# Patient Record
Sex: FEMALE | URBAN - METROPOLITAN AREA
[De-identification: names, ages, dates, MRNs, and addresses within clinical notes are randomized per-mention and may not be internally consistent; named-entity substitution may affect disease eponyms.]

---

## 2018-09-24 ENCOUNTER — OFFICE VISIT (OUTPATIENT)
Dept: URBAN - METROPOLITAN AREA CLINIC 33 | Facility: CLINIC | Age: 68
End: 2018-09-24
Payer: MEDICARE

## 2018-09-24 DIAGNOSIS — H40.1111 PRIMARY OPEN-ANGLE GLAUCOMA, RIGHT EYE, MILD STAGE: Primary | ICD-10-CM

## 2018-09-24 PROCEDURE — 99213 OFFICE O/P EST LOW 20 MIN: CPT | Performed by: OPTOMETRIST

## 2018-09-24 RX ORDER — LATANOPROST 50 UG/ML
0.005 % SOLUTION OPHTHALMIC
Qty: 3 | Refills: 3 | Status: INACTIVE
Start: 2018-09-24 | End: 2019-12-23

## 2018-09-24 ASSESSMENT — INTRAOCULAR PRESSURE
OS: 22
OD: 14
OS: 20
OD: 19

## 2018-09-24 NOTE — IMPRESSION/PLAN
Impression: Primary open-angle glaucoma, right eye, mild stage: H40.1111. Plan: IOP 14/22 w/ Lumigan QHS OU. No visual field defects OD noted, mild RNFL thinning at previous ONH-OCT OU. Continue Lumigan QHS OU.  RTC 6 months for CFM and ONH-OCT OU

## 2019-03-25 ENCOUNTER — OFFICE VISIT (OUTPATIENT)
Dept: URBAN - METROPOLITAN AREA CLINIC 33 | Facility: CLINIC | Age: 69
End: 2019-03-25
Payer: MEDICARE

## 2019-03-25 DIAGNOSIS — H40.1423 CAPSULAR GLAUCOMA W/ PSEUDOEXFOLIATION OF LENS OF LEFT EYE, SEVERE STAGE: Primary | ICD-10-CM

## 2019-03-25 PROCEDURE — 92133 CPTRZD OPH DX IMG PST SGM ON: CPT | Performed by: OPTOMETRIST

## 2019-03-25 PROCEDURE — 99213 OFFICE O/P EST LOW 20 MIN: CPT | Performed by: OPTOMETRIST

## 2019-03-25 ASSESSMENT — INTRAOCULAR PRESSURE
OD: 14
OS: 22

## 2019-03-25 NOTE — IMPRESSION/PLAN
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 Plan: IOPS 14/22 with Lumigan QHS OU. Intraocular pressure WNL. Continue same treatment. Continue Lumigan QHS OU .

## 2019-09-25 ENCOUNTER — OFFICE VISIT (OUTPATIENT)
Dept: URBAN - METROPOLITAN AREA CLINIC 33 | Facility: CLINIC | Age: 69
End: 2019-09-25
Payer: MEDICARE

## 2019-09-25 PROCEDURE — 99213 OFFICE O/P EST LOW 20 MIN: CPT | Performed by: OPTOMETRIST

## 2019-09-25 ASSESSMENT — INTRAOCULAR PRESSURE
OS: 20
OS: 24
OD: 17
OD: 20

## 2019-09-25 NOTE — IMPRESSION/PLAN
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 Plan: IOPS 17/20 with Latanoprost  QHS OU. Intraocular pressure WNL. Continue same treatment. 6 month for ONH-OCT and IOP check.

## 2020-06-16 ENCOUNTER — OFFICE VISIT (OUTPATIENT)
Dept: URBAN - METROPOLITAN AREA CLINIC 33 | Facility: CLINIC | Age: 70
End: 2020-06-16
Payer: MEDICARE

## 2020-06-16 PROCEDURE — 99213 OFFICE O/P EST LOW 20 MIN: CPT | Performed by: OPTOMETRIST

## 2020-06-16 PROCEDURE — 92133 CPTRZD OPH DX IMG PST SGM ON: CPT | Performed by: OPTOMETRIST

## 2020-06-16 ASSESSMENT — INTRAOCULAR PRESSURE
OS: 17
OD: 18

## 2020-06-16 NOTE — IMPRESSION/PLAN
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 Plan: IOPS 18/17 with avg. pachs and Latanoprost QHS OU. Intraocular pressure WNL for optic nerve appearance. Continue same treatment. 6 month dilation and IOP check.

## 2020-12-15 ENCOUNTER — OFFICE VISIT (OUTPATIENT)
Dept: URBAN - METROPOLITAN AREA CLINIC 33 | Facility: CLINIC | Age: 70
End: 2020-12-15
Payer: MEDICARE

## 2020-12-15 PROCEDURE — 99213 OFFICE O/P EST LOW 20 MIN: CPT | Performed by: OPTOMETRIST

## 2020-12-15 ASSESSMENT — INTRAOCULAR PRESSURE
OS: 21
OD: 18

## 2020-12-15 NOTE — IMPRESSION/PLAN
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 Plan: IOPS 18/121 with Latanoprost QHS OU and average pachs. Pressures WNL. Continue the same medication regimen.

## 2021-06-15 ENCOUNTER — OFFICE VISIT (OUTPATIENT)
Dept: URBAN - METROPOLITAN AREA CLINIC 33 | Facility: CLINIC | Age: 71
End: 2021-06-15
Payer: COMMERCIAL

## 2021-06-15 PROCEDURE — 99213 OFFICE O/P EST LOW 20 MIN: CPT | Performed by: OPTOMETRIST

## 2021-06-15 ASSESSMENT — INTRAOCULAR PRESSURE
OS: 20
OD: 18

## 2021-06-15 NOTE — IMPRESSION/PLAN
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 Plan: IOPS 18/20 with Latanoprost QHS OU and average pachs. Intraocular pressures normal. Continue using Latanoprost QHS OU.

## 2021-12-16 ENCOUNTER — OFFICE VISIT (OUTPATIENT)
Dept: URBAN - METROPOLITAN AREA CLINIC 33 | Facility: CLINIC | Age: 71
End: 2021-12-16
Payer: MEDICARE

## 2021-12-16 PROCEDURE — 92083 EXTENDED VISUAL FIELD XM: CPT | Performed by: OPTOMETRIST

## 2021-12-16 PROCEDURE — 99213 OFFICE O/P EST LOW 20 MIN: CPT | Performed by: OPTOMETRIST

## 2021-12-16 ASSESSMENT — INTRAOCULAR PRESSURE
OS: 20
OD: 19

## 2021-12-16 NOTE — IMPRESSION/PLAN
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 Plan: IOPS 19/20 with Latanoprost QHS OU and average pachs. Pressures WNL. Visual field shows OD: edge defect, poor reliability. Continue the same medication regimen.

## 2022-06-17 ENCOUNTER — OFFICE VISIT (OUTPATIENT)
Dept: URBAN - METROPOLITAN AREA CLINIC 33 | Facility: CLINIC | Age: 72
End: 2022-06-17
Payer: MEDICARE

## 2022-06-17 DIAGNOSIS — H40.1423 CAPSULAR GLAUCOMA W/ PSEUDOEXFOLIATION OF LENS OF LEFT EYE, SEVERE STAGE: Primary | ICD-10-CM

## 2022-06-17 PROCEDURE — 99213 OFFICE O/P EST LOW 20 MIN: CPT | Performed by: OPTOMETRIST

## 2022-06-17 ASSESSMENT — INTRAOCULAR PRESSURE
OD: 14
OS: 15

## 2022-12-20 ENCOUNTER — OFFICE VISIT (OUTPATIENT)
Dept: URBAN - METROPOLITAN AREA CLINIC 33 | Facility: CLINIC | Age: 72
End: 2022-12-20
Payer: MEDICARE

## 2022-12-20 DIAGNOSIS — H40.1111 PRIMARY OPEN-ANGLE GLAUCOMA, RIGHT EYE, MILD STAGE: Primary | ICD-10-CM

## 2022-12-20 DIAGNOSIS — H40.1423 CAPSULAR GLAUCOMA W/ PSEUDOEXFOLIATION OF LENS OF LEFT EYE, SEVERE STAGE: ICD-10-CM

## 2022-12-20 PROCEDURE — 99213 OFFICE O/P EST LOW 20 MIN: CPT | Performed by: OPTOMETRIST

## 2022-12-20 PROCEDURE — 92083 EXTENDED VISUAL FIELD XM: CPT | Performed by: OPTOMETRIST

## 2022-12-20 ASSESSMENT — INTRAOCULAR PRESSURE
OS: 22
OD: 18

## 2022-12-20 NOTE — IMPRESSION/PLAN
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  Plan: See plan #1

## 2022-12-20 NOTE — IMPRESSION/PLAN
Impression: Primary open-angle glaucoma, right eye, mild stage: H40.1111. Plan: IOPS 18/22 with Latanoprost QHS OU and average pachs. Condition stable for optic nerve appearance. Return for visual field and follow up. -Reviewed visual field with patient today.

## 2023-06-21 ENCOUNTER — OFFICE VISIT (OUTPATIENT)
Dept: URBAN - METROPOLITAN AREA CLINIC 33 | Facility: CLINIC | Age: 73
End: 2023-06-21
Payer: COMMERCIAL

## 2023-06-21 DIAGNOSIS — H40.1111 PRIMARY OPEN-ANGLE GLAUCOMA, RIGHT EYE, MILD STAGE: Primary | ICD-10-CM

## 2023-06-21 PROCEDURE — 99213 OFFICE O/P EST LOW 20 MIN: CPT | Performed by: OPTOMETRIST

## 2023-06-21 PROCEDURE — 92133 CPTRZD OPH DX IMG PST SGM ON: CPT | Performed by: OPTOMETRIST

## 2023-06-21 ASSESSMENT — INTRAOCULAR PRESSURE
OD: 19
OS: 20

## 2023-06-21 NOTE — IMPRESSION/PLAN
Impression: Primary open-angle glaucoma, right eye, mild stage: H40.1111. Plan: IOPS 19/20 with Latanoprost QHS OU and average pachs. Intraocular pressure continues to be WNL. Ordered and reviewed ONH-OCT showing, OD: mild inferior thinning OS: severe thinning. Continue Latanoprost at this time.

## 2023-12-22 ENCOUNTER — OFFICE VISIT (OUTPATIENT)
Dept: URBAN - METROPOLITAN AREA CLINIC 33 | Facility: CLINIC | Age: 73
End: 2023-12-22
Payer: COMMERCIAL

## 2023-12-22 DIAGNOSIS — H40.1423 CAPSULAR GLAUCOMA W/ PSEUDOEXFOLIATION OF LENS OF LEFT EYE, SEVERE STAGE: Primary | ICD-10-CM

## 2023-12-22 DIAGNOSIS — H40.1111 PRIMARY OPEN-ANGLE GLAUCOMA, RIGHT EYE, MILD STAGE: ICD-10-CM

## 2023-12-22 PROCEDURE — 92083 EXTENDED VISUAL FIELD XM: CPT

## 2023-12-22 PROCEDURE — 99213 OFFICE O/P EST LOW 20 MIN: CPT

## 2023-12-22 ASSESSMENT — INTRAOCULAR PRESSURE
OS: 24
OS: 26
OD: 20

## 2024-06-26 ENCOUNTER — OFFICE VISIT (OUTPATIENT)
Dept: URBAN - METROPOLITAN AREA CLINIC 33 | Facility: CLINIC | Age: 74
End: 2024-06-26
Payer: COMMERCIAL

## 2024-06-26 DIAGNOSIS — H40.1423 CAPSULAR GLAUCOMA WITH PSEUDOEXFOLIATION OF LENS, LEFT EYE, SEVERE STAGE: Primary | ICD-10-CM

## 2024-06-26 DIAGNOSIS — H40.1111 PRIMARY OPEN-ANGLE GLAUCOMA, RIGHT EYE, MILD STAGE: ICD-10-CM

## 2024-06-26 PROCEDURE — 99213 OFFICE O/P EST LOW 20 MIN: CPT

## 2024-06-26 PROCEDURE — 92133 CPTRZD OPH DX IMG PST SGM ON: CPT

## 2024-06-26 RX ORDER — TIMOLOL MALEATE 5 MG/ML
0.5 % SOLUTION/ DROPS OPHTHALMIC
Qty: 3 | Refills: 11 | Status: ACTIVE
Start: 2024-06-26

## 2024-06-26 ASSESSMENT — INTRAOCULAR PRESSURE
OD: 20
OD: 23
OS: 21
OS: 26

## 2024-06-26 ASSESSMENT — VISUAL ACUITY: OD: 20/20

## 2024-12-20 ENCOUNTER — OFFICE VISIT (OUTPATIENT)
Dept: URBAN - METROPOLITAN AREA CLINIC 33 | Facility: CLINIC | Age: 74
End: 2024-12-20
Payer: COMMERCIAL

## 2024-12-20 DIAGNOSIS — H40.1111 PRIMARY OPEN-ANGLE GLAUCOMA, RIGHT EYE, MILD STAGE: ICD-10-CM

## 2024-12-20 DIAGNOSIS — H40.1423 CAPSULAR GLAUCOMA WITH PSEUDOEXFOLIATION OF LENS, LEFT EYE, SEVERE STAGE: Primary | ICD-10-CM

## 2024-12-20 PROCEDURE — 99213 OFFICE O/P EST LOW 20 MIN: CPT

## 2024-12-20 PROCEDURE — 92083 EXTENDED VISUAL FIELD XM: CPT

## 2024-12-20 ASSESSMENT — INTRAOCULAR PRESSURE
OS: 16
OD: 16

## 2025-07-08 NOTE — IMPRESSION/PLAN
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 Plan: IOPS 14/15 with Latanoprost QHS OU and average pachs. Condition stable with current regimen. Return for visual field and follow up. 1) Recommend when medically feasible resume PO diet.   2) Monitor PO intake, Labs, weights, BMs, and skin integrity.   3) RD to remain available for further nutritional interventions as indicated.